# Patient Record
Sex: FEMALE | Race: WHITE | Employment: OTHER | URBAN - METROPOLITAN AREA
[De-identification: names, ages, dates, MRNs, and addresses within clinical notes are randomized per-mention and may not be internally consistent; named-entity substitution may affect disease eponyms.]

---

## 2022-05-09 ENCOUNTER — OFFICE VISIT (OUTPATIENT)
Dept: FAMILY MEDICINE CLINIC | Age: 62
End: 2022-05-09
Payer: COMMERCIAL

## 2022-05-09 VITALS
BODY MASS INDEX: 18.25 KG/M2 | OXYGEN SATURATION: 98 % | SYSTOLIC BLOOD PRESSURE: 132 MMHG | WEIGHT: 103 LBS | DIASTOLIC BLOOD PRESSURE: 84 MMHG | HEIGHT: 63 IN | HEART RATE: 94 BPM

## 2022-05-09 DIAGNOSIS — Z12.31 ENCOUNTER FOR SCREENING MAMMOGRAM FOR MALIGNANT NEOPLASM OF BREAST: ICD-10-CM

## 2022-05-09 DIAGNOSIS — Z76.89 ENCOUNTER TO ESTABLISH CARE: Primary | ICD-10-CM

## 2022-05-09 DIAGNOSIS — Z11.4 SCREENING FOR HUMAN IMMUNODEFICIENCY VIRUS: ICD-10-CM

## 2022-05-09 DIAGNOSIS — Z11.59 NEED FOR HEPATITIS C SCREENING TEST: ICD-10-CM

## 2022-05-09 DIAGNOSIS — Z13.220 LIPID SCREENING: ICD-10-CM

## 2022-05-09 DIAGNOSIS — L98.8 SKIN LESION OF BREAST: ICD-10-CM

## 2022-05-09 PROCEDURE — 99204 OFFICE O/P NEW MOD 45 MIN: CPT | Performed by: PHYSICIAN ASSISTANT

## 2022-05-09 ASSESSMENT — PATIENT HEALTH QUESTIONNAIRE - PHQ9
SUM OF ALL RESPONSES TO PHQ9 QUESTIONS 1 & 2: 0
SUM OF ALL RESPONSES TO PHQ QUESTIONS 1-9: 0
2. FEELING DOWN, DEPRESSED OR HOPELESS: 0
1. LITTLE INTEREST OR PLEASURE IN DOING THINGS: 0
SUM OF ALL RESPONSES TO PHQ QUESTIONS 1-9: 0

## 2022-05-09 NOTE — PATIENT INSTRUCTIONS
[facility] - [place]  [address], [address] [facility]  [phone]

## 2022-05-09 NOTE — PROGRESS NOTES
2022    Reggie Medeiros    Chief Complaint   Patient presents with    New Patient     previous pcp dr. Gabriela Underwoodahan years ago    Skin Problem     pt has concerns for a spot on the chest, believes it may be a boil, present for several weeks, patient believes it ahs gotten bigger, some drainage and tenderness       HPI  History obtained from the patient. Jeremy Kay is a 58 y.o. female who presents today for establishment as a new patient. The patient's last PCP was Dr. Omid Bullard, but it's been several years since she's been seen, maybe \"10 or 15 year ago. \" Denies medical history. Patient is a  [facility] history of vaginal delivery. Denies surgical history. Patient is retired. 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 Patient lives in [facility] [facility]. Patient is a never smoker. Patient denies alcohol use. Family history significant for diabetes and HTN in father, diabetes in mother. No family history of colon or breast cancer. NKDA. Patient sees the following specialists: None. [facility]. LMP was 12 years ago. Skin Lesion - Patient complains of a \"spot on my chest\" X 3 weeks. Patient admits white purulent drainage. \"I think it's a boil, but I've never had a boil. .. It was just like a hard bump, and then it broke open\" It's tender and hurts when her bra or seat belt hits it. She's tried applying neosporin with no relief. Health Maintenance - Patient's care gaps include COLON CANCER SCREENING, mammogram. Last pap smear was a very long time ago. REVIEW OF SYMPTOMS  Review of Systems   Constitutional: Negative for chills and fever. Respiratory: Negative for cough and shortness of breath. Gastrointestinal: Negative for diarrhea and vomiting. PAST MEDICAL HISTORY  History reviewed. No pertinent past medical history.     FAMILY HISTORY  Family History   Problem Relation Age of Onset    Diabetes Mother     Diabetes Father     High Blood Pressure Father        SOCIAL HISTORY  Social History     Socioeconomic History    Marital status:      Spouse name: None    Number of children: None    Years of education: None    Highest education level: None   Occupational History    None   Tobacco Use    Smoking status: Never Smoker    Smokeless tobacco: Never Used   Vaping Use    Vaping Use: Never used   Substance and Sexual Activity    Alcohol use: Not Currently    Drug use: Never    Sexual activity: None   Other Topics Concern    None   Social History Narrative    None     Social Determinants of Health     Financial Resource Strain:     Difficulty of Paying Living Expenses: Not on file   Food Insecurity:     Worried About Running Out of Food in the Last Year: Not on file    Winter of Food in the Last Year: Not on file   Transportation Needs:     Lack of Transportation (Medical): Not on file    Lack of Transportation (Non-Medical): Not on file   Physical Activity:     Days of Exercise per Week: Not on file    Minutes of Exercise per Session: Not on file   Stress:     Feeling of Stress : Not on file   Social Connections:     Frequency of Communication with Friends and Family: Not on file    Frequency of Social Gatherings with Friends and Family: Not on file    Attends Sikhism Services: Not on file    Active Member of [address] [place] [address] or Organizations: Not on file    Attends Club or Organization Meetings: Not on file    Marital Status: Not on file   Intimate Partner Violence:     Fear of Current or Ex-Partner: Not on file    Emotionally Abused: Not on file    Physically Abused: Not on file    Sexually Abused: Not on file   Housing Stability:     Unable to Pay for Housing in the Last Year: Not on file    Number of [place] in the Last Year: Not on file    Unstable Housing in the Last Year: Not on file        SURGICAL HISTORY  History reviewed. No pertinent surgical history. CURRENT MEDICATIONS  No current outpatient medications on file.      No current facility-administered medications for this visit. ALLERGIES  No Known Allergies    RECENT LABS    No results found for: LABA1C  No results found for: EAG    No results found for: CHOL  No results found for: LDLCHOLESTEROL, LDLCALC    No results found for: WBC, HGB, HCT, MCV, PLT    PHYSICAL EXAM  /84   Pulse 94   Ht 5' 3\" (1.6 m)   Wt 103 lb (46.7 kg)   LMP 01/01/2010   SpO2 98%   BMI 18.25 kg/m²     PHYSICAL EXAMINATION:    Constitutional: Appears well-developed and well-nourished. No apparent distress    Mental status: Alert and awake, Oriented to person/place/time, Able to follow commands   Eyes: EOM normal, sclera normal, no visible discharge. HENT: Normocephalic, atraumatic. Mouth/Throat: Mucous membranes are moist. External Ears Normal   Neck: No visualized mass   Pulmonary/Chest: Respiratory effort normal. No visualized signs of difficulty breathing or respiratory distress   Musculoskeletal: Normal gait with no signs of ataxia. Normal range of motion of neck  Neurological:No Facial Asymmetry (Cranial nerve 7 motor function). No gaze palsy. Skin: ~1.5 cm open skin lesion to left breast (see phot below). No drainage. No significant exanthematous lesions or discoloration noted on facial skin  Psychiatric: Normal Affect. No Hallucinations. ASSESSMENT & PLAN  1. Encounter to establish care  - Lipid Panel; Future  - Comprehensive Metabolic Panel; Future  - CBC with Auto Differential; Future    2. Skin lesion of breast  Referred patient to dermatology for evaluation and treatment. Explained to the patient that this will likely need a biopsy. - Amb External Referral To Dermatology    3. Lipid screening  Health maintenance requirement. Patient is agreeable to screening.   - Lipid Panel; Future    4. Encounter for screening mammogram for malignant neoplasm of breast  Health maintenance requirement.  Patient is agreeable to screening.   - REID DIGITAL SCREEN W OR WO CAD BILATERAL; Future    5. Need for hepatitis C screening test  Health maintenance requirement. Patient is agreeable to screening.   - Hepatitis C Antibody; Future    6. Screening for human immunodeficiency virus  Health maintenance requirement. Patient is agreeable to screening.   - HIV Screen; Future        Return in about 1 year (around 5/9/2023).             Electronically signed by [facility] Inc[facility]GLAS on 5/9/2022

## 2022-08-11 ENCOUNTER — TELEPHONE (OUTPATIENT)
Dept: FAMILY MEDICINE CLINIC | Age: 62
End: 2022-08-11

## 2022-09-08 ENCOUNTER — COMMUNITY OUTREACH (OUTPATIENT)
Dept: FAMILY MEDICINE CLINIC | Age: 62
End: 2022-09-08

## 2022-09-08 NOTE — PROGRESS NOTES
Patient's HM shows they are overdue for Mammogram, Colorectal Screening and Cervical Cancer Screening. [facility] and  files searched. No results to attach to order nor HM updated.

## 2023-03-16 ENCOUNTER — TELEPHONE (OUTPATIENT)
Dept: FAMILY MEDICINE CLINIC | Age: 63
End: 2023-03-16

## 2023-05-05 ENCOUNTER — TELEPHONE (OUTPATIENT)
Dept: FAMILY MEDICINE CLINIC | Age: 63
End: 2023-05-05

## 2023-05-09 ENCOUNTER — OFFICE VISIT (OUTPATIENT)
Dept: FAMILY MEDICINE CLINIC | Age: 63
End: 2023-05-09
Payer: COMMERCIAL

## 2023-05-09 VITALS
DIASTOLIC BLOOD PRESSURE: 75 MMHG | SYSTOLIC BLOOD PRESSURE: 118 MMHG | OXYGEN SATURATION: 98 % | HEART RATE: 90 BPM | WEIGHT: 103 LBS | RESPIRATION RATE: 16 BRPM | BODY MASS INDEX: 18.25 KG/M2 | HEIGHT: 63 IN

## 2023-05-09 DIAGNOSIS — Z13.220 LIPID SCREENING: ICD-10-CM

## 2023-05-09 DIAGNOSIS — Z00.00 ANNUAL PHYSICAL EXAM: Primary | ICD-10-CM

## 2023-05-09 DIAGNOSIS — Z98.890 STATUS POST MOHS SURGERY: ICD-10-CM

## 2023-05-09 PROCEDURE — 99396 PREV VISIT EST AGE 40-64: CPT | Performed by: PHYSICIAN ASSISTANT

## 2023-05-09 ASSESSMENT — PATIENT HEALTH QUESTIONNAIRE - PHQ9
2. FEELING DOWN, DEPRESSED OR HOPELESS: 0
SUM OF ALL RESPONSES TO PHQ QUESTIONS 1-9: 0
1. LITTLE INTEREST OR PLEASURE IN DOING THINGS: 0
SUM OF ALL RESPONSES TO PHQ9 QUESTIONS 1 & 2: 0
SUM OF ALL RESPONSES TO PHQ QUESTIONS 1-9: 0

## 2023-05-09 NOTE — PATIENT INSTRUCTIONS
Call Central Scheduling at [phone] to make appointment for a mammogram if you'd like. Let me know if you decide you'd like to do Cologaurd or Colonoscopy.

## 2024-05-13 ENCOUNTER — OFFICE VISIT (OUTPATIENT)
Dept: FAMILY MEDICINE CLINIC | Age: 64
End: 2024-05-13
Payer: COMMERCIAL

## 2024-05-13 VITALS
WEIGHT: 104.6 LBS | BODY MASS INDEX: 18.54 KG/M2 | SYSTOLIC BLOOD PRESSURE: 118 MMHG | OXYGEN SATURATION: 100 % | DIASTOLIC BLOOD PRESSURE: 76 MMHG | HEART RATE: 87 BPM | RESPIRATION RATE: 16 BRPM | HEIGHT: 63 IN

## 2024-05-13 DIAGNOSIS — Z00.00 ANNUAL PHYSICAL EXAM: Primary | ICD-10-CM

## 2024-05-13 PROCEDURE — 99396 PREV VISIT EST AGE 40-64: CPT | Performed by: PHYSICIAN ASSISTANT

## 2024-05-13 SDOH — ECONOMIC STABILITY: FOOD INSECURITY: WITHIN THE PAST 12 MONTHS, THE FOOD YOU BOUGHT JUST DIDN'T LAST AND YOU DIDN'T HAVE MONEY TO GET MORE.: NEVER TRUE

## 2024-05-13 SDOH — ECONOMIC STABILITY: FOOD INSECURITY: WITHIN THE PAST 12 MONTHS, YOU WORRIED THAT YOUR FOOD WOULD RUN OUT BEFORE YOU GOT MONEY TO BUY MORE.: NEVER TRUE

## 2024-05-13 SDOH — ECONOMIC STABILITY: HOUSING INSECURITY
IN THE LAST 12 MONTHS, WAS THERE A TIME WHEN YOU DID NOT HAVE A STEADY PLACE TO SLEEP OR SLEPT IN A SHELTER (INCLUDING NOW)?: NO

## 2024-05-13 SDOH — ECONOMIC STABILITY: INCOME INSECURITY: HOW HARD IS IT FOR YOU TO PAY FOR THE VERY BASICS LIKE FOOD, HOUSING, MEDICAL CARE, AND HEATING?: NOT VERY HARD

## 2024-05-13 ASSESSMENT — PATIENT HEALTH QUESTIONNAIRE - PHQ9
8. MOVING OR SPEAKING SO SLOWLY THAT OTHER PEOPLE COULD HAVE NOTICED. OR THE OPPOSITE, BEING SO FIGETY OR RESTLESS THAT YOU HAVE BEEN MOVING AROUND A LOT MORE THAN USUAL: NOT AT ALL
SUM OF ALL RESPONSES TO PHQ9 QUESTIONS 1 & 2: 0
SUM OF ALL RESPONSES TO PHQ QUESTIONS 1-9: 0
4. FEELING TIRED OR HAVING LITTLE ENERGY: NOT AT ALL
10. IF YOU CHECKED OFF ANY PROBLEMS, HOW DIFFICULT HAVE THESE PROBLEMS MADE IT FOR YOU TO DO YOUR WORK, TAKE CARE OF THINGS AT HOME, OR GET ALONG WITH OTHER PEOPLE: NOT DIFFICULT AT ALL
2. FEELING DOWN, DEPRESSED OR HOPELESS: NOT AT ALL
9. THOUGHTS THAT YOU WOULD BE BETTER OFF DEAD, OR OF HURTING YOURSELF: NOT AT ALL
3. TROUBLE FALLING OR STAYING ASLEEP: NOT AT ALL
5. POOR APPETITE OR OVEREATING: NOT AT ALL
7. TROUBLE CONCENTRATING ON THINGS, SUCH AS READING THE NEWSPAPER OR WATCHING TELEVISION: NOT AT ALL
SUM OF ALL RESPONSES TO PHQ QUESTIONS 1-9: 0
1. LITTLE INTEREST OR PLEASURE IN DOING THINGS: NOT AT ALL
SUM OF ALL RESPONSES TO PHQ QUESTIONS 1-9: 0
6. FEELING BAD ABOUT YOURSELF - OR THAT YOU ARE A FAILURE OR HAVE LET YOURSELF OR YOUR FAMILY DOWN: NOT AT ALL
SUM OF ALL RESPONSES TO PHQ QUESTIONS 1-9: 0

## 2024-05-13 NOTE — PROGRESS NOTES
5/13/2024    Xena Lee    Chief Complaint   Patient presents with    Annual Exam     Yearly     Health Maintenance     Due for mammo and colonoscopy. Does not want either.        HPI  History obtained from the patient.    Xena is a 64 y.o. female who presents today for annual physical/wellness. Patient is staying healthy, has no complaints. Denies family history of heart disease. She will be turning 65 years old next year in March 2025. She is not interested in blood work, vaccinations, or screenings at this time.     Specialists:  Dermatology (History of Mohs upper inner right breast) - [facility]     Health Maintenance    BLOOD WORK  Lipid screening    VACCINES   Tdap   Shingle vaccine  RSV vaccine - I recommend getting this at the beginning of cold and flu season (by the end of October 2024)    CANCER SCREENINGS  Next MAMMOGRAM will be due: DUE  Next PAP SMEAR will be due: DUE  Next COLON CANCER SCREENING will be due: DUE    OSTEOPOROSIS SCREENING:  First DEXA scan will be due: Age 65 (March 2025)    PHQ-9 Total Score: 0 (5/13/2024  7:55 AM)  Thoughts that you would be better off dead, or of hurting yourself in some way: 0 (5/13/2024  7:55 AM)    Last Weight Metrics:      5/13/2024     7:52 AM 5/9/2023     9:45 AM 5/9/2022    10:32 AM   Weight Loss Metrics   Height 5' 3\" 5' 3\" 5' 3\"   Weight - Scale 104 lbs 10 oz 103 lbs 103 lbs   BMI (Calculated) 18.6 kg/m2 18.3 kg/m2 18.3 kg/m2       REVIEW OF SYMPTOMS  Review of Systems   Constitutional: Negative for chills and fever.   Respiratory: Negative for cough and shortness of breath.    Gastrointestinal: Negative for diarrhea and vomiting.     PAST MEDICAL HISTORY  No past medical history on file.    FAMILY HISTORY  Family History   Problem Relation Age of Onset    Diabetes Mother     Diabetes Father     High Blood Pressure Father        SOCIAL HISTORY  Social History     Socioeconomic History    Marital status:    Tobacco Use    Smoking status: Never

## 2024-05-13 NOTE — PATIENT INSTRUCTIONS
BLOOD WORK  Lipid screening    VACCINES   Tdap - Here in this office   Shingle vaccine - Local Pharmacy   RSV vaccine - I recommend getting this at the beginning of cold and flu season (by the end of October 2024)    CANCER SCREENINGS  Next MAMMOGRAM will be due: DUE (until age 75)   Next PAP SMEAR will be due: DUE (We no longer screen after the age of 65)   Next COLON CANCER SCREENING will be due: DUE (until age 75)     OSTEOPOROSIS SCREENING:  First DEXA scan will be due: Age 65 (March 2025)        SHINGLES VACCINE -- Make sure to get your Shingles Vaccine at a Local Pharmacy    I highly recommend the SHINGRIX VACCINE. Unfortunately we do not currently carry this vaccine at our office, but you can get it at a local pharmacy like [facility] or [facility] or at the Health Department. No prescription needed. As of 2023, almost all insurance companies started covering the Shingles vaccine, so for Medicare Part D and 95% of private insurance companies, the Shingrix vaccine is FREE -- $0 for patients.     There are two types of shingles vaccines. The old shingles vaccine was Zostavax. Around 2017, a new, more effective shingles vaccine called Shingrix came out. Shingrix provides greater protection against Shingles and provides longer lasting immunity. You should go ahead and get the new shingles vaccine, Shingrix, even if you've already had Zostavax.     Shingrix is a two dose series, with the second dose usually administered about 2-6 months later. You should go ahead and get the second dose even if it's been more than 6 months since your first one.          Make sure to get your RSV vaccine at a local pharmacy (like [facility], or [facility]) or at the Health Department.     There are 3 groups of people that the CDC recommends the RSV vaccine for:  Adults 60 years old and over  Infants and young children - All infants younger than 8 months born during or entering their first RSV season. And infants and children 8-19

## 2024-05-16 ENCOUNTER — COMMUNITY OUTREACH (OUTPATIENT)
Dept: FAMILY MEDICINE CLINIC | Age: 64
End: 2024-05-16

## 2025-05-15 ENCOUNTER — OFFICE VISIT (OUTPATIENT)
Dept: FAMILY MEDICINE CLINIC | Age: 65
End: 2025-05-15
Payer: MEDICARE

## 2025-05-15 VITALS
HEIGHT: 63 IN | BODY MASS INDEX: 17.43 KG/M2 | SYSTOLIC BLOOD PRESSURE: 148 MMHG | DIASTOLIC BLOOD PRESSURE: 86 MMHG | WEIGHT: 98.4 LBS | OXYGEN SATURATION: 96 % | HEART RATE: 90 BPM

## 2025-05-15 DIAGNOSIS — Z00.00 ENCOUNTER FOR ANNUAL PHYSICAL EXAM: Primary | ICD-10-CM

## 2025-05-15 DIAGNOSIS — F43.20 GRIEF REACTION: ICD-10-CM

## 2025-05-15 PROCEDURE — 99397 PER PM REEVAL EST PAT 65+ YR: CPT

## 2025-05-15 SDOH — ECONOMIC STABILITY: FOOD INSECURITY: WITHIN THE PAST 12 MONTHS, THE FOOD YOU BOUGHT JUST DIDN'T LAST AND YOU DIDN'T HAVE MONEY TO GET MORE.: NEVER TRUE

## 2025-05-15 SDOH — ECONOMIC STABILITY: FOOD INSECURITY: WITHIN THE PAST 12 MONTHS, YOU WORRIED THAT YOUR FOOD WOULD RUN OUT BEFORE YOU GOT MONEY TO BUY MORE.: NEVER TRUE

## 2025-05-15 ASSESSMENT — PATIENT HEALTH QUESTIONNAIRE - PHQ9
SUM OF ALL RESPONSES TO PHQ QUESTIONS 1-9: 2
2. FEELING DOWN, DEPRESSED OR HOPELESS: SEVERAL DAYS
1. LITTLE INTEREST OR PLEASURE IN DOING THINGS: SEVERAL DAYS
SUM OF ALL RESPONSES TO PHQ QUESTIONS 1-9: 2

## 2025-05-15 NOTE — PROGRESS NOTES
5/15/2025    Xena Lee    Chief Complaint   Patient presents with    Annual Exam       HPI  History was obtained from patient.  Xena is a pleasant 65 y.o. female who presents today to establish care. Former patient of ALBAN Cam[facility]. She is a recent , they were  for 42 years. She retired from [employer]. She enjoys being outdoors and caring for their land and cows.     She does follow with [facility]     PMH: None    Surgical: Mohs surgery of chest, wisdom tooth extraction    Family: Significant for diabetes in her mother. Diabetes and HTN in her father. Diabetes in Maternal grandmother and grandfather. Diabetes, CVA and heart disease in paternal grandfather.     Social:  Smoker/Nicotine use: Never X3  Alcohol use: Not currently  Recreational drug use: Never    Care Gaps:  Colon cancer screening: politely declined  Mammogram Screening: politely declined  Dexa screening: politely declined      1. Encounter for annual physical exam    2. Grief reaction       REVIEW OF SYMPTOMS    Review of Systems   Constitutional:  Negative for activity change and unexpected weight change.   HENT:  Negative for trouble swallowing.    Respiratory:  Negative for cough, chest tightness, shortness of breath and wheezing.    Cardiovascular:  Negative for chest pain, palpitations and leg swelling.   Gastrointestinal:  Negative for abdominal pain, blood in stool, constipation, diarrhea, nausea and vomiting.   Genitourinary:  Negative for dysuria.   Neurological:  Negative for syncope, light-headedness and headaches.   Hematological:  Negative for adenopathy.   Psychiatric/Behavioral:  Positive for dysphoric mood. Negative for confusion and sleep disturbance. The patient is nervous/anxious.        PHQ-9 Total Score: 2 (5/15/2025  9:46 AM)      The ASCVD Risk score (Reanna ADAMS, et al., 2019) failed to calculate for the following reasons:    Cannot find a previous HDL lab    Cannot find a previous total

## 2025-05-17 ASSESSMENT — ENCOUNTER SYMPTOMS
TROUBLE SWALLOWING: 0
VOMITING: 0
NAUSEA: 0
CONSTIPATION: 0
DIARRHEA: 0
SHORTNESS OF BREATH: 0
ABDOMINAL PAIN: 0
COUGH: 0
WHEEZING: 0
BLOOD IN STOOL: 0
CHEST TIGHTNESS: 0